# Patient Record
Sex: FEMALE | Race: WHITE | NOT HISPANIC OR LATINO | Employment: UNEMPLOYED | URBAN - METROPOLITAN AREA
[De-identification: names, ages, dates, MRNs, and addresses within clinical notes are randomized per-mention and may not be internally consistent; named-entity substitution may affect disease eponyms.]

---

## 2024-05-03 ENCOUNTER — OFFICE VISIT (OUTPATIENT)
Age: 34
End: 2024-05-03
Payer: COMMERCIAL

## 2024-05-03 ENCOUNTER — APPOINTMENT (OUTPATIENT)
Age: 34
End: 2024-05-03
Payer: COMMERCIAL

## 2024-05-03 VITALS
HEART RATE: 96 BPM | SYSTOLIC BLOOD PRESSURE: 104 MMHG | TEMPERATURE: 96.8 F | DIASTOLIC BLOOD PRESSURE: 64 MMHG | OXYGEN SATURATION: 97 % | WEIGHT: 127.2 LBS | RESPIRATION RATE: 18 BRPM
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
## 2024-05-03 DIAGNOSIS — S83.92XA SPRAIN OF LEFT KNEE, UNSPECIFIED LIGAMENT, INITIAL ENCOUNTER: ICD-10-CM

## 2024-05-03 DIAGNOSIS — Z23 ENCOUNTER FOR IMMUNIZATION: ICD-10-CM

## 2024-05-03 DIAGNOSIS — T07.XXXA WOUNDS, MULTIPLE: ICD-10-CM

## 2024-05-03 PROCEDURE — 90715 TDAP VACCINE 7 YRS/> IM: CPT | Performed by: PHYSICIAN ASSISTANT

## 2024-05-03 PROCEDURE — 99213 OFFICE O/P EST LOW 20 MIN: CPT | Performed by: PHYSICIAN ASSISTANT

## 2024-05-03 PROCEDURE — S9088 SERVICES PROVIDED IN URGENT: HCPCS | Performed by: PHYSICIAN ASSISTANT

## 2024-05-03 PROCEDURE — 90715 TDAP VACCINE 7 YRS/> IM: CPT

## 2024-05-03 PROCEDURE — 73564 X-RAY EXAM KNEE 4 OR MORE: CPT

## 2024-05-03 RX ORDER — AMOXICILLIN AND CLAVULANATE POTASSIUM 875; 125 MG/1; MG/1
1 TABLET, FILM COATED ORAL EVERY 12 HOURS SCHEDULED
Qty: 14 TABLET | Refills: 0 | Status: SHIPPED | OUTPATIENT
Start: 2024-05-03 | End: 2024-05-10

## 2024-05-03 NOTE — PROGRESS NOTES
[facility]        NAME: Sharon Paulino is a 33 y.o. female  : 1990    MRN: [id number]  DATE: May 3, 2024  TIME: 9:31 PM    Assessment and Plan   Dog bite, initial encounter [W54.0XXA]  1. Dog bite, initial encounter  amoxicillin-clavulanate (AUGMENTIN) 875-125 mg per tablet      2. Fall, initial encounter  XR knee 4+ vw left injury      3. Sprain of left knee, unspecified ligament, initial encounter  Ambulatory Referral to Orthopedic Surgery      4. Encounter for immunization  Tdap Vaccine greater than or equal to 8yo      5. Wounds, multiple            Augmentin was prescribed for prophylaxis after these multiple dog bite injuries.  There is no lacerations requiring any irrigation or suturing all were superficially closed and she is ready rinse the mouth.  We did discuss updating tetanus booster she did except that.  We also discussed possibly going to the ER today for the first series of rabies vaccines as she does not know the dog that bit her well and was instructed to stay away from the owner so she will not be able to know if they are being watched carefully or not there for rabies vaccination was advised.  Patient reports she will go to the ED tomorrow for this.  Follow-up with Ortho if she still having significant knee pain next week    The patient verbalized understanding of exam findings and treatment plan.   We engaged in the shared decision-making process and treatment options were   discussed at length with the patient.  All questions, concerns and  complaints were answered and addressed to the patient's satisfaction.    Patient Instructions   There are no Patient Instructions on file for this visit.    Follow up with PCP in 3-5 days.  Proceed to  ER if symptoms worsen.    If tests are performed, our office will contact you with results only if   changes need to made to the care plan discussed with you at the visit.   [facility]     Chief  Complaint     Chief Complaint   Patient presents with   • Wound Infection     Multiple open wound of both lower legs and pain / swelling medial L knee. Claims was defending her dogs when they were attacked by neighbor's dogs last PM. Unsure if any wounds from bites / scratches or merely from falling and rolling on rocks. Last tetanus immunization between 5 and 10 years ago.         History of Present Illness       HPI  Patient comes in complaining of multiple wounds on both lower legs they are in the right thigh lower leg and ankle and left lower leg.  As well as left medial aspect the knee.  All some scratches the right upper extremity.  She reports irrigating wash the wounds out and using some antibacterial preparation over them.  She reports she does not know the vaccination status of the dogs, does not know the owners very well was instructed by authorities that we are familiar as well given the situation.    Review of Systems   Review of Systems  All other related systems reviewed and are negative except as noted in HPI    Current Medications       Current Outpatient Medications:   •  amoxicillin-clavulanate (AUGMENTIN) 875-125 mg per tablet, Take 1 tablet by mouth every 12 (twelve) hours for 7 days, Disp: 14 tablet, Rfl: 0    Current Allergies     Allergies as of 05/03/2024   • (No Known Allergies)            The following portions of the patient's history were reviewed and updated as appropriate: allergies, current medications, past family history, past medical history, past social history, past surgical history and problem list.     No past medical history on file.    No past surgical history on file.    No family history on file.      Medications have been verified.        Objective   /64 (BP Location: Left arm, Patient Position: Sitting, Cuff Size: Adult)   Pulse 96   Temp (!) 96.8 °F (36 °C) (Tympanic)   Resp 18   Wt 57.7 kg (127 lb 3.2 oz)   LMP 04/26/2024 (Exact Date)   SpO2 97%   Patient's  "last menstrual period was 04/26/2024 (exact date).       Physical Exam     Physical Exam  Constitutional:       General: She is not in acute distress.     Appearance: She is well-developed.   HENT:      Head: Normocephalic and atraumatic.   Eyes:      General: No scleral icterus.     Conjunctiva/sclera: Conjunctivae normal.   Neck:      Trachea: No tracheal deviation.   Pulmonary:      Effort: Pulmonary effort is normal. No respiratory distress.      Breath sounds: No stridor.   Musculoskeletal:      Cervical back: Normal range of motion.      Comments: There is some tenderness and ecchymosis over the left medial femoral condyle mild tenderness but no laxity with valgus stress of the left knee.  Otherwise no bony tenderness palpation anywhere else there is soft tissue injury is abrasions small puncture wounds left knee, right thigh lower leg and ankle, right upper extremity there are few small wounds as well.  No evidence of overt infection over any of these wounds no evidence of cellulitis currently.   Skin:     General: Skin is warm and dry.      Findings: No erythema.   Neurological:      Mental Status: She is alert and oriented to person, place, and time.   Psychiatric:         Behavior: Behavior normal.         Ortho Exam        Procedures  No Procedures performed today        Note: Portions of this record may have been created with voice recognition software. Occasional wrong word or \"sound a like\" substitutions may have occurred due to the inherent limitations of voice recognition software. Please read the chart carefully and recognize, using context, where substitutions have occurred.*      "

## 2024-05-07 ENCOUNTER — OFFICE VISIT (OUTPATIENT)
Age: 34
End: 2024-05-07
Payer: COMMERCIAL

## 2024-05-07 VITALS
HEART RATE: 79 BPM | RESPIRATION RATE: 18 BRPM | SYSTOLIC BLOOD PRESSURE: 109 MMHG | DIASTOLIC BLOOD PRESSURE: 72 MMHG | WEIGHT: 127.7 LBS | TEMPERATURE: 97.6 F | OXYGEN SATURATION: 100 %

## 2024-05-07 DIAGNOSIS — Z20.3 RABIES EXPOSURE: Primary | ICD-10-CM

## 2024-05-07 PROCEDURE — 99213 OFFICE O/P EST LOW 20 MIN: CPT

## 2024-05-07 PROCEDURE — 90471 IMMUNIZATION ADMIN: CPT

## 2024-05-07 PROCEDURE — 90675 RABIES VACCINE IM: CPT

## 2024-05-07 PROCEDURE — S9088 SERVICES PROVIDED IN URGENT: HCPCS

## 2024-05-09 ENCOUNTER — TELEPHONE (OUTPATIENT)
Age: 34
End: 2024-05-09

## 2024-05-09 NOTE — TELEPHONE ENCOUNTER
Patient was called this morning and informed of her x-rays.  No acute findings other than [place], which patient reported she is aware of. Currently denied furtherance of knee pain, as per patient.     Encouraged Ortho follow up. Patient expressed verbal understanding.

## 2024-05-11 ENCOUNTER — OFFICE VISIT (OUTPATIENT)
Age: 34
End: 2024-05-11
Payer: COMMERCIAL

## 2024-05-11 VITALS
RESPIRATION RATE: 18 BRPM | OXYGEN SATURATION: 98 % | SYSTOLIC BLOOD PRESSURE: 98 MMHG | HEART RATE: 79 BPM | TEMPERATURE: 96.4 F | DIASTOLIC BLOOD PRESSURE: 56 MMHG

## 2024-05-11 DIAGNOSIS — Z20.3 NEED FOR POST EXPOSURE PROPHYLAXIS FOR RABIES: Primary | ICD-10-CM

## 2024-05-11 PROCEDURE — 90675 RABIES VACCINE IM: CPT

## 2024-05-21 ENCOUNTER — OFFICE VISIT (OUTPATIENT)
Dept: OBGYN CLINIC | Facility: CLINIC | Age: 34
End: 2024-05-21
Payer: COMMERCIAL

## 2024-05-21 VITALS
SYSTOLIC BLOOD PRESSURE: 112 MMHG | WEIGHT: 125.8 LBS | HEART RATE: 89 BPM | HEIGHT: 65 IN | DIASTOLIC BLOOD PRESSURE: 74 MMHG | BODY MASS INDEX: 20.96 KG/M2

## 2024-05-21 DIAGNOSIS — S83.8X2A INJURY OF MENISCUS OF LEFT KNEE, INITIAL ENCOUNTER: ICD-10-CM

## 2024-05-21 DIAGNOSIS — R29.898 WEAKNESS OF BOTH HIPS: ICD-10-CM

## 2024-05-21 DIAGNOSIS — M22.2X2 PATELLOFEMORAL SYNDROME OF LEFT KNEE: ICD-10-CM

## 2024-05-21 DIAGNOSIS — S83.412A SPRAIN OF MEDIAL COLLATERAL LIGAMENT OF LEFT KNEE, INITIAL ENCOUNTER: Primary | ICD-10-CM

## 2024-05-21 PROCEDURE — 99203 OFFICE O/P NEW LOW 30 MIN: CPT | Performed by: FAMILY MEDICINE

## 2024-05-21 RX ORDER — TRAMADOL HYDROCHLORIDE 50 MG/1
TABLET ORAL
COMMUNITY

## 2024-05-21 RX ORDER — AMOXICILLIN AND CLAVULANATE POTASSIUM 875; 125 MG/1; MG/1
1 TABLET, FILM COATED ORAL 2 TIMES DAILY
COMMUNITY
Start: 2024-05-16 | End: 2024-05-26

## 2024-05-21 NOTE — PROGRESS NOTES
Assessment/Plan:  Assessment & Plan   Diagnoses and all orders for this visit:    Sprain of medial collateral ligament of left knee, initial encounter  -     Ambulatory Referral to Orthopedic Surgery  -     Ambulatory Referral to Physical Therapy; Future    Injury of meniscus of left knee, initial encounter  -     Ambulatory Referral to Physical Therapy; Future    Patellofemoral syndrome of left knee  -     Brace  -     Ambulatory Referral to Physical Therapy; Future    Weakness of both hips  -     Ambulatory Referral to Physical Therapy; Future    Other orders  -     traMADol (ULTRAM) 50 mg tablet; TAKE 1 TAB (50MG TOTAL) BY MOUTH EVERY 8 (EIGHT) HOURS AS NEEDED FOR SEVERE PAIN (PAIN SCORE 7-10)  -     amoxicillin-clavulanate (AUGMENTIN) 875-125 mg per tablet; Take 1 tablet by mouth 2 (two) times a day      32-year-old female with onset left knee pain and swelling from injury on 5/2/2024.  Discussed the patient physical exam, radiographs, impression, and plan.  X-rays of the left knee are unremarkable for acute osseous abnormality.  Physical exam noted for mild medial soft tissue swelling.  She has mild tenderness medial femoral condyle, MCL, medial joint line and patella facet.  She has full extension of flexion to 130 degrees.  There is no appreciable collateral ligament laxity however mild pain with valgus stress.  There is positive medial Macario's.  There is no groin pain with SARAH and FADDIR of the hips.  She had weakness both hips with flexion and abduction.  Clinical impression is that she has symptoms from MCL sprain and meniscus tear is not excluded.  I discussed treatment regimen of support, supplements, topical anti-inflammatory, and formal therapy.  Surgery is not warranted.  I provided patellar stabilizing brace to wear during physical activity.  She is to apply topical diclofenac gel 3 times daily for the next 10 days.  She is to take turmeric, tart cherry, and glucosamine supplements.  She is to  "start formal therapy as soon as possible and do home exercises as directed.  She will follow-up as needed.            Subjective:   Patient ID: Sharon Paulino is a 33 y.o. female.  Chief Complaint   Patient presents with    Left Knee - Pain        32-year-old female presents for evaluation of left knee pain onset from injury on 5/2/2024.  She was breaking up a fight between her dogs and neighbors dogs.  Her knee twisted and she fell to the ground.  She had pain described as sudden in onset, localized to the medial aspect of the left knee, none radiating, worse with bearing weight and ambulating, worse with twisting, associated with swelling, and improved with resting.  She presented to urgent care where x-ray evaluation was unremarkable.  She was advised to follow-up with Medicare.  She has been icing the area and also applying topical anesthetics.  Pain and swelling have been improving.    Knee Pain  This is a new problem. The current episode started 1 to 4 weeks ago. The problem occurs daily. The problem has been gradually improving. Associated symptoms include arthralgias and joint swelling. Pertinent negatives include no numbness or weakness. The symptoms are aggravated by twisting. She has tried rest, NSAIDs, ice and oral narcotics for the symptoms. The treatment provided mild relief.           The following portions of the patient's history were reviewed and updated as appropriate: She  has no past medical history on file.  She is allergic to acetaminophen..    Review of Systems   Musculoskeletal:  Positive for arthralgias and joint swelling.   Neurological:  Negative for weakness and numbness.       Objective:  Vitals:    05/21/24 1112   BP: 112/74   Pulse: 89   Weight: 57.1 kg (125 lb 12.8 oz)   Height: 5' 5\" (1.651 m)      Left Ankle Exam     Muscle Strength   Dorsiflexion:  5/5   Plantar flexion:  5/5       Left Knee Exam     Muscle Strength   The patient has normal left knee strength.    Tenderness   The " patient is experiencing tenderness in the medial joint line and MCL (Medial femoral condyle, patella facet).    Range of Motion   Extension:  normal   Flexion:  130     Tests   Macario:  Medial - positive    Valgus: positive (pain, no laxity)    Other   Swelling: mild    Comments:  Positive patella inhibition and grind      Right Hip Exam     Muscle Strength   Abduction: 4/5   Flexion: 4/5     Tests   SARAH: negative    Comments:  Negative FADDIR      Left Hip Exam     Muscle Strength   Abduction: 4/5   Flexion: 4/5     Tests   SARAH: negative    Comments:  Negative FADDIR          Strength/Myotome Testing     Left Ankle/Foot   Dorsiflexion: 5  Plantar flexion: 5      Physical Exam  Vitals and nursing note reviewed.   Constitutional:       Appearance: Normal appearance. She is well-developed. She is not ill-appearing or diaphoretic.   HENT:      Head: Normocephalic and atraumatic.      Right Ear: External ear normal.      Left Ear: External ear normal.   Eyes:      Conjunctiva/sclera: Conjunctivae normal.   Neck:      Trachea: No tracheal deviation.   Cardiovascular:      Rate and Rhythm: Normal rate.   Pulmonary:      Effort: Pulmonary effort is normal. No respiratory distress.   Abdominal:      General: There is no distension.   Musculoskeletal:         General: Swelling and tenderness present.      Left knee:      Instability Tests: Medial Macario test positive.   Skin:     General: Skin is warm and dry.      Coloration: Skin is not jaundiced or pale.   Neurological:      Mental Status: She is alert and oriented to person, place, and time.   Psychiatric:         Mood and Affect: Mood normal.         Behavior: Behavior normal.         Thought Content: Thought content normal.         Judgment: Judgment normal.         I have personally reviewed pertinent films in PACS and my interpretation is  .  No acute osseous abnormality of the left knee.

## 2024-05-21 NOTE — PATIENT INSTRUCTIONS
Over the counter vitamins:    - Turmeric vitamin at least 1000 mg daily    - Tart cherry vitamin at least 1000 mg daily    - Glucosamine-chondrointin 2-3 times daily      Over the counter diclofenac gel/voltaren   -3 times daily for 10 days    Physical Therapy and home exercises  
No